# Patient Record
Sex: FEMALE | Race: BLACK OR AFRICAN AMERICAN | NOT HISPANIC OR LATINO
[De-identification: names, ages, dates, MRNs, and addresses within clinical notes are randomized per-mention and may not be internally consistent; named-entity substitution may affect disease eponyms.]

---

## 2017-06-29 ENCOUNTER — APPOINTMENT (OUTPATIENT)
Dept: ORTHOPEDIC SURGERY | Facility: CLINIC | Age: 47
End: 2017-06-29

## 2017-06-29 VITALS
BODY MASS INDEX: 26.61 KG/M2 | DIASTOLIC BLOOD PRESSURE: 77 MMHG | HEART RATE: 93 BPM | HEIGHT: 59 IN | WEIGHT: 132 LBS | SYSTOLIC BLOOD PRESSURE: 118 MMHG

## 2017-06-29 DIAGNOSIS — M25.561 PAIN IN RIGHT KNEE: ICD-10-CM

## 2017-06-30 ENCOUNTER — APPOINTMENT (OUTPATIENT)
Dept: ORTHOPEDIC SURGERY | Facility: CLINIC | Age: 47
End: 2017-06-30

## 2017-06-30 VITALS
SYSTOLIC BLOOD PRESSURE: 115 MMHG | HEART RATE: 63 BPM | WEIGHT: 130 LBS | DIASTOLIC BLOOD PRESSURE: 74 MMHG | HEIGHT: 59 IN | BODY MASS INDEX: 26.21 KG/M2

## 2017-06-30 DIAGNOSIS — Z86.39 PERSONAL HISTORY OF OTHER ENDOCRINE, NUTRITIONAL AND METABOLIC DISEASE: ICD-10-CM

## 2017-06-30 DIAGNOSIS — Z82.61 FAMILY HISTORY OF ARTHRITIS: ICD-10-CM

## 2017-06-30 DIAGNOSIS — Z80.9 FAMILY HISTORY OF MALIGNANT NEOPLASM, UNSPECIFIED: ICD-10-CM

## 2017-06-30 DIAGNOSIS — M54.5 LOW BACK PAIN: ICD-10-CM

## 2017-06-30 DIAGNOSIS — Z87.09 PERSONAL HISTORY OF OTHER DISEASES OF THE RESPIRATORY SYSTEM: ICD-10-CM

## 2017-06-30 DIAGNOSIS — M54.2 CERVICALGIA: ICD-10-CM

## 2018-01-02 ENCOUNTER — APPOINTMENT (OUTPATIENT)
Dept: RADIOLOGY | Facility: IMAGING CENTER | Age: 48
End: 2018-01-02

## 2019-07-15 ENCOUNTER — TRANSCRIPTION ENCOUNTER (OUTPATIENT)
Age: 49
End: 2019-07-15

## 2019-07-15 ENCOUNTER — APPOINTMENT (OUTPATIENT)
Dept: ORTHOPEDIC SURGERY | Facility: CLINIC | Age: 49
End: 2019-07-15
Payer: COMMERCIAL

## 2019-07-15 VITALS
HEART RATE: 81 BPM | HEIGHT: 59 IN | DIASTOLIC BLOOD PRESSURE: 91 MMHG | WEIGHT: 130 LBS | SYSTOLIC BLOOD PRESSURE: 156 MMHG | BODY MASS INDEX: 26.21 KG/M2

## 2019-07-15 DIAGNOSIS — M25.562 PAIN IN LEFT KNEE: ICD-10-CM

## 2019-07-15 PROCEDURE — 99214 OFFICE O/P EST MOD 30 MIN: CPT

## 2019-07-16 PROBLEM — M25.562 ACUTE PAIN OF LEFT KNEE: Status: ACTIVE | Noted: 2019-07-16

## 2019-07-16 NOTE — PHYSICAL EXAM
[de-identified] : Oriented to time, place, person\par Mood: Normal\par Affect: Normal\par Appearance: Healthy, well appearing, no acute distress.\par Gait: Normal\par Assistive Devices: None\par \par Left knee exam\par \par Skin: Clean, dry, intact\par Inspection: No obvious malalignment, no masses, no swelling, no effusion\par Pulses: 2+ DP/PT pulses\par ROM: 0-135 degrees of flexion. No pain with deep knee flexion/extension.\par Tenderness: No MJLT. Mild LJLT. Minimal pain over the patella facets. No pain to the quadriceps tendon. No pain to the patella tendon. No posterior knee tenderness.\par Stability: Stable to varus, valgus. Negative lachman testing. Negative anterior drawer, negative posterior drawer.\par Strength: 5/5 Q/H/TA/GS/EHL, without atrophy\par Neuro: In tact to light touch throughout, DTR's normal\par Additional tests: Negative McMurrays test, Negative patellar grind test. \par \par

## 2019-07-16 NOTE — HISTORY OF PRESENT ILLNESS
[de-identified] : 48-year-old female presents with pain to the left knee. Patient's pain began in April, and reported swelling through the lateral joint. Patient reported immediate discomfort through the lateral joint line, and was concerned for tear. Previously seen for right knee pain in 2017. Afraid to return to exercise, and obtained x-ray in [place] which per report was negative. Would like to obtain an MRI to determine any internal derangement. Symptoms are mild, no catching locking or buckling.

## 2019-07-16 NOTE — DISCUSSION/SUMMARY
[de-identified] : 48-year-old female with left knee pain\par \par New onset left knee pain without significant concern for internal derangement. Patient is hesitant to return to impact loading exercise, and is requesting MRI to confirm no internal derangement. Discussed that even if there was internal derangement, surgical intervention would likely not be warranted given minimal symptoms clinically. Recommendations were made for conservative management.\par \par Recommendation: Conservative care & observation, this includes rest/activity avoidance until less symptomatic with subsequent gradual return to full activity as tolerated. Patient may also use OTC NSAID's or acetaminophen as tolerated, with application of ice to the area 2-3x daily for 20 minutes after periods of activity. \par \par MRI prescription provided.\par \par Followup after MRI